# Patient Record
(demographics unavailable — no encounter records)

---

## 2025-01-30 NOTE — DISCUSSION/SUMMARY
[de-identified] : The underlying pathophysiology was reviewed with the patient. XR films were reviewed with the patient. Discussed at length the nature of the patients condition. The left symptoms appear secondary to an intraarticular fracture of the radial condyle of proximal phalanx. The patient and I discussed her options regarding treatment.  Patient was advised to take OTC medications and topical analgesic for pain management. The patient was placed in a gab taped using Coban tape. The patient was instructed on ROM exercises of the shoulder, elbow, hand and wrist while splinted. Activity modification was discussed in great detail.  All questions answered, understanding verbalized. Patient in agreement with plan of care. The patient can follow-up in 7-10 days If the patient begins to experience any changes or severe exacerbation of her symptoms, she should reach out to me as soon as possible.

## 2025-01-30 NOTE — RETURN TO WORK/SCHOOL
[FreeTextEntry1] : MAREK MORTENSEN was evaluated in the office today, 01/30/2025. No gym or sports until further notice. [FreeTextEntry2] : Dr. Bg Ambrosio M.D. on 01/30/2025

## 2025-01-30 NOTE — ADDENDUM
[FreeTextEntry1] : I, Shahram Robb wrote this note acting as a scribe for Dr. Bg Ambrosio on 01/30/2025.   All medical record entries made by the Scribe were at my, Dr. Bg Ambrosio M.D., direction and personally dictated by me on 01/30/2025. I have personally reviewed the chart and agree that the record accurately reflects my personal performance of the history, physical exam, assessment and plan

## 2025-01-30 NOTE — PHYSICAL EXAM
[de-identified] : Patient is WDWN, alert, and in no acute distress. Breathing is unlabored. She is grossly oriented to person, place, and time.  She is accompanied here with her mother.  Left Hand: Inspection/Palpation: Tenderness over the fracture site. No edema. Range of Motion: full arc of motion, except for the little finger Strength: all intrinsic and extrinsic hand muscles 5/5, except for the little finger Stability: no joint instability on provocative testing Sensation: sensation intact to light touch [de-identified] : X-ray of the left hand obtained from [facility] on 01/23/2025 and reviewed in the office today, 01/30/2025, revealed: intraarticular fracture of the radial condyle of proximal phalanx

## 2025-01-30 NOTE — HISTORY OF PRESENT ILLNESS
[de-identified] : Pt is an 12 y/o female with right little finger fracture.  A basketball jammed her finger on 1/23/25.  She had pain immediately.  She developed swelling and ecchymosis.  She had xrays which revealed a right proximal phalanx fracture.  A splint was applied and she was advised to follow up with a specialist.

## 2025-02-06 NOTE — ADDENDUM
[FreeTextEntry1] : I, Shahram Robb wrote this note acting as a scribe for Dr. Bg Ambrosio on 02/06/2025.   All medical record entries made by the Scribe were at my, Dr. Bg Ambrosio M.D., direction and personally dictated by me on 02/06/2025. I have personally reviewed the chart and agree that the record accurately reflects my personal performance of the history, physical exam, assessment and plan

## 2025-02-06 NOTE — HISTORY OF PRESENT ILLNESS
[de-identified] : Pt is an 12 y/o female with right little finger fracture.  A basketball jammed her finger on 1/23/25.  She had pain immediately.  She developed swelling and ecchymosis.  She had xrays which revealed a right proximal phalanx fracture.  A splint was applied and she was advised to follow up with a specialist.  Today, 02/06/2025, the patient presents for a follow-up evaluation and further care of her right little finger, proximal phalanx fracture. Her symptoms have improved since last visit but she still endorses mild pain. She has difficulty making fists.

## 2025-02-06 NOTE — PHYSICAL EXAM
[de-identified] : Patient is WDWN, alert, and in no acute distress. Breathing is unlabored. She is grossly oriented to person, place, and time.  She is accompanied here with her mother.  Right Hand: Inspection/Palpation: Tenderness over the fracture site. No edema. Range of Motion: full arc of motion, except for the little finger 0-80 at PIP joint Strength: all intrinsic and extrinsic hand muscles 5/5, except for the little finger Stability: no joint instability on provocative testing Sensation: sensation intact to light touch [de-identified] : X-ray of the right little finger hand were obtained today and revealed intraarticular fracture of the radial condyle of proximal phalanx. Fracture is healing well. Good anatomical alignment.

## 2025-02-06 NOTE — DISCUSSION/SUMMARY
[de-identified] : The underlying pathophysiology was reviewed with the patient. XR films were reviewed with the patient. Discussed at length the nature of the patients condition. The right little finger symptoms appear secondary to an intraarticular fracture of the radial condyle of proximal phalanx. The patient and I discussed her options regarding treatment.  Patient was advised to take OTC medications and topical analgesic for pain management. I advised the patient to practice home exercises and stretches to increase her ROM. Activity modification was discussed in great detail. she can continue to gab tape her finger.  All questions answered, understanding verbalized. Patient in agreement with plan of care. The patient can follow-up in 4 weeks. If the patient begins to experience any changes or severe exacerbation of her symptoms, she should reach out to me as soon as possible.